# Patient Record
Sex: MALE | Race: WHITE | NOT HISPANIC OR LATINO | Employment: OTHER | URBAN - METROPOLITAN AREA
[De-identification: names, ages, dates, MRNs, and addresses within clinical notes are randomized per-mention and may not be internally consistent; named-entity substitution may affect disease eponyms.]

---

## 2024-03-30 ENCOUNTER — HOSPITAL ENCOUNTER (EMERGENCY)
Facility: HOSPITAL | Age: 69
Discharge: HOME | End: 2024-03-31
Attending: EMERGENCY MEDICINE
Payer: COMMERCIAL

## 2024-03-30 DIAGNOSIS — R10.31 RIGHT LOWER QUADRANT ABDOMINAL PAIN: Primary | ICD-10-CM

## 2024-03-30 LAB
ANION GAP BLDV CALCULATED.4IONS-SCNC: 13 MMOL/L (ref 10–25)
BASE EXCESS BLDV CALC-SCNC: -3.8 MMOL/L (ref -2–3)
BASOPHILS # BLD AUTO: 0.04 X10*3/UL (ref 0–0.1)
BASOPHILS NFR BLD AUTO: 0.9 %
BODY TEMPERATURE: 37 DEGREES CELSIUS
CA-I BLDV-SCNC: 1.18 MMOL/L (ref 1.1–1.33)
CHLORIDE BLDV-SCNC: 100 MMOL/L (ref 98–107)
EOSINOPHIL # BLD AUTO: 0.21 X10*3/UL (ref 0–0.7)
EOSINOPHIL NFR BLD AUTO: 4.7 %
ERYTHROCYTE [DISTWIDTH] IN BLOOD BY AUTOMATED COUNT: 16.1 % (ref 11.5–14.5)
GLUCOSE BLDV-MCNC: 85 MG/DL (ref 74–99)
HCO3 BLDV-SCNC: 21.5 MMOL/L (ref 22–26)
HCT VFR BLD AUTO: 28.7 % (ref 41–52)
HCT VFR BLD EST: 32 % (ref 41–52)
HGB BLD-MCNC: 10.1 G/DL (ref 13.5–17.5)
HGB BLDV-MCNC: 10.6 G/DL (ref 13.5–17.5)
IMM GRANULOCYTES # BLD AUTO: 0.06 X10*3/UL (ref 0–0.7)
IMM GRANULOCYTES NFR BLD AUTO: 1.3 % (ref 0–0.9)
INHALED O2 CONCENTRATION: 21 %
LACTATE BLDV-SCNC: 1.6 MMOL/L (ref 0.4–2)
LYMPHOCYTES # BLD AUTO: 1.35 X10*3/UL (ref 1.2–4.8)
LYMPHOCYTES NFR BLD AUTO: 30.1 %
MCH RBC QN AUTO: 28.5 PG (ref 26–34)
MCHC RBC AUTO-ENTMCNC: 35.2 G/DL (ref 32–36)
MCV RBC AUTO: 81 FL (ref 80–100)
MONOCYTES # BLD AUTO: 0.27 X10*3/UL (ref 0.1–1)
MONOCYTES NFR BLD AUTO: 6 %
NEUTROPHILS # BLD AUTO: 2.55 X10*3/UL (ref 1.2–7.7)
NEUTROPHILS NFR BLD AUTO: 57 %
NRBC BLD-RTO: 0 /100 WBCS (ref 0–0)
OXYHGB MFR BLDV: 71.4 % (ref 45–75)
PCO2 BLDV: 39 MM HG (ref 41–51)
PH BLDV: 7.35 PH (ref 7.33–7.43)
PLATELET # BLD AUTO: 290 X10*3/UL (ref 150–450)
PO2 BLDV: 47 MM HG (ref 35–45)
POTASSIUM BLDV-SCNC: 4.8 MMOL/L (ref 3.5–5.3)
RBC # BLD AUTO: 3.55 X10*6/UL (ref 4.5–5.9)
SAO2 % BLDV: 73 % (ref 45–75)
SODIUM BLDV-SCNC: 130 MMOL/L (ref 136–145)
WBC # BLD AUTO: 4.5 X10*3/UL (ref 4.4–11.3)

## 2024-03-30 PROCEDURE — 84132 ASSAY OF SERUM POTASSIUM: CPT

## 2024-03-30 PROCEDURE — 36415 COLL VENOUS BLD VENIPUNCTURE: CPT

## 2024-03-30 PROCEDURE — 99283 EMERGENCY DEPT VISIT LOW MDM: CPT

## 2024-03-30 PROCEDURE — 85025 COMPLETE CBC W/AUTO DIFF WBC: CPT

## 2024-03-30 PROCEDURE — 83690 ASSAY OF LIPASE: CPT

## 2024-03-30 PROCEDURE — 99284 EMERGENCY DEPT VISIT MOD MDM: CPT | Performed by: EMERGENCY MEDICINE

## 2024-03-30 PROCEDURE — 83735 ASSAY OF MAGNESIUM: CPT

## 2024-03-30 ASSESSMENT — PAIN DESCRIPTION - ONSET: ONSET: ONGOING

## 2024-03-30 ASSESSMENT — PAIN - FUNCTIONAL ASSESSMENT: PAIN_FUNCTIONAL_ASSESSMENT: 0-10

## 2024-03-30 ASSESSMENT — PAIN DESCRIPTION - PROGRESSION: CLINICAL_PROGRESSION: NOT CHANGED

## 2024-03-30 ASSESSMENT — COLUMBIA-SUICIDE SEVERITY RATING SCALE - C-SSRS
6. HAVE YOU EVER DONE ANYTHING, STARTED TO DO ANYTHING, OR PREPARED TO DO ANYTHING TO END YOUR LIFE?: YES
6. HAVE YOU EVER DONE ANYTHING, STARTED TO DO ANYTHING, OR PREPARED TO DO ANYTHING TO END YOUR LIFE?: YES
2. HAVE YOU ACTUALLY HAD ANY THOUGHTS OF KILLING YOURSELF?: YES
1. IN THE PAST MONTH, HAVE YOU WISHED YOU WERE DEAD OR WISHED YOU COULD GO TO SLEEP AND NOT WAKE UP?: YES
5. HAVE YOU STARTED TO WORK OUT OR WORKED OUT THE DETAILS OF HOW TO KILL YOURSELF? DO YOU INTEND TO CARRY OUT THIS PLAN?: YES
4. HAVE YOU HAD THESE THOUGHTS AND HAD SOME INTENTION OF ACTING ON THEM?: YES

## 2024-03-30 ASSESSMENT — PAIN DESCRIPTION - DESCRIPTORS: DESCRIPTORS: ACHING;CRAMPING

## 2024-03-30 ASSESSMENT — LIFESTYLE VARIABLES
EVER HAD A DRINK FIRST THING IN THE MORNING TO STEADY YOUR NERVES TO GET RID OF A HANGOVER: NO
HAVE PEOPLE ANNOYED YOU BY CRITICIZING YOUR DRINKING: NO
HAVE YOU EVER FELT YOU SHOULD CUT DOWN ON YOUR DRINKING: NO
TOTAL SCORE: 0
EVER FELT BAD OR GUILTY ABOUT YOUR DRINKING: NO

## 2024-03-30 ASSESSMENT — PAIN DESCRIPTION - PAIN TYPE: TYPE: ACUTE PAIN

## 2024-03-30 ASSESSMENT — PAIN DESCRIPTION - LOCATION: LOCATION: ABDOMEN

## 2024-03-30 ASSESSMENT — PAIN DESCRIPTION - FREQUENCY: FREQUENCY: CONSTANT/CONTINUOUS

## 2024-03-30 ASSESSMENT — PAIN SCALES - GENERAL
PAINLEVEL_OUTOF10: 10 - WORST POSSIBLE PAIN
PAINLEVEL_OUTOF10: 10 - WORST POSSIBLE PAIN

## 2024-03-31 VITALS
BODY MASS INDEX: 34.81 KG/M2 | WEIGHT: 235.01 LBS | HEIGHT: 69 IN | TEMPERATURE: 97 F | OXYGEN SATURATION: 97 % | HEART RATE: 80 BPM | SYSTOLIC BLOOD PRESSURE: 149 MMHG | DIASTOLIC BLOOD PRESSURE: 64 MMHG | RESPIRATION RATE: 18 BRPM

## 2024-03-31 PROBLEM — N17.9 AKI (ACUTE KIDNEY INJURY) (CMS-HCC): Status: ACTIVE | Noted: 2023-08-22

## 2024-03-31 PROBLEM — Z59.01 SHELTERED HOMELESSNESS: Status: ACTIVE | Noted: 2023-09-01

## 2024-03-31 PROBLEM — E53.8 B12 DEFICIENCY: Status: ACTIVE | Noted: 2019-09-19

## 2024-03-31 PROBLEM — I89.0 LYMPHEDEMA: Status: ACTIVE | Noted: 2024-03-25

## 2024-03-31 PROBLEM — I70.209 ATHEROSCLEROSIS OF NATIVE ARTERY OF EXTREMITY (CMS-HCC): Status: ACTIVE | Noted: 2023-09-25

## 2024-03-31 PROBLEM — E83.42 HYPOMAGNESEMIA: Status: ACTIVE | Noted: 2021-05-26

## 2024-03-31 PROBLEM — E66.812 OBESITY, CLASS II, BMI 35-39.9: Status: ACTIVE | Noted: 2023-09-26

## 2024-03-31 PROBLEM — G89.29 CHRONIC LOW BACK PAIN WITH BILATERAL SCIATICA: Status: ACTIVE | Noted: 2021-05-26

## 2024-03-31 PROBLEM — T14.8XXA WOUND INFECTION: Status: ACTIVE | Noted: 2023-08-11

## 2024-03-31 PROBLEM — R19.7 DIARRHEA: Status: ACTIVE | Noted: 2023-04-03

## 2024-03-31 PROBLEM — I48.0 PAROXYSMAL ATRIAL FIBRILLATION (MULTI): Status: ACTIVE | Noted: 2022-09-29

## 2024-03-31 PROBLEM — M48.061 SPINAL STENOSIS OF LUMBAR REGION: Status: ACTIVE | Noted: 2019-02-14

## 2024-03-31 PROBLEM — D64.9 ANEMIA: Status: ACTIVE | Noted: 2023-09-01

## 2024-03-31 PROBLEM — B35.6 FUNGAL INFECTION OF THE GROIN: Status: ACTIVE | Noted: 2023-08-08

## 2024-03-31 PROBLEM — E11.9 TYPE 2 DIABETES MELLITUS, WITHOUT LONG-TERM CURRENT USE OF INSULIN (MULTI): Chronic | Status: ACTIVE | Noted: 2021-05-26

## 2024-03-31 PROBLEM — M54.41 CHRONIC LOW BACK PAIN WITH BILATERAL SCIATICA: Status: ACTIVE | Noted: 2021-05-26

## 2024-03-31 PROBLEM — S99.921A FOOT TRAUMA, RIGHT, INITIAL ENCOUNTER: Status: ACTIVE | Noted: 2023-09-06

## 2024-03-31 PROBLEM — I87.8 VENOUS STASIS: Status: ACTIVE | Noted: 2024-03-25

## 2024-03-31 PROBLEM — K59.09 OTHER CONSTIPATION: Status: ACTIVE | Noted: 2024-03-31

## 2024-03-31 PROBLEM — S09.90XA HEAD INJURY: Status: ACTIVE | Noted: 2023-10-04

## 2024-03-31 PROBLEM — M54.42 CHRONIC LOW BACK PAIN WITH BILATERAL SCIATICA: Status: ACTIVE | Noted: 2021-05-26

## 2024-03-31 PROBLEM — G83.4 CAUDA EQUINA SYNDROME (MULTI): Status: ACTIVE | Noted: 2022-11-28

## 2024-03-31 PROBLEM — E66.9 OBESITY, CLASS I, BMI 30-34.9: Status: ACTIVE | Noted: 2023-08-09

## 2024-03-31 PROBLEM — T14.8XXA CHRONIC WOUND: Status: ACTIVE | Noted: 2023-08-19

## 2024-03-31 PROBLEM — K92.2 LOWER GI BLEED: Status: ACTIVE | Noted: 2023-01-15

## 2024-03-31 PROBLEM — R29.898 WEAKNESS OF BOTH LOWER EXTREMITIES: Status: ACTIVE | Noted: 2019-02-14

## 2024-03-31 PROBLEM — K56.41 FECAL IMPACTION (MULTI): Status: ACTIVE | Noted: 2024-03-31

## 2024-03-31 PROBLEM — Z22.322 MRSA (METHICILLIN RESISTANT STAPH AUREUS) CULTURE POSITIVE: Status: ACTIVE | Noted: 2019-08-28

## 2024-03-31 PROBLEM — E87.1 HYPO-OSMOLALITY AND HYPONATREMIA: Status: ACTIVE | Noted: 2022-08-26

## 2024-03-31 PROBLEM — M86.9 OSTEOMYELITIS (MULTI): Status: ACTIVE | Noted: 2022-05-30

## 2024-03-31 PROBLEM — L85.3 DRY SKIN DERMATITIS: Status: ACTIVE | Noted: 2020-01-31

## 2024-03-31 PROBLEM — F17.200 TOBACCO DEPENDENCE: Status: ACTIVE | Noted: 2021-05-07

## 2024-03-31 PROBLEM — M86.9 PYOGENIC INFLAMMATION OF BONE (MULTI): Status: ACTIVE | Noted: 2023-07-14

## 2024-03-31 PROBLEM — E87.20 METABOLIC ACIDOSIS: Status: ACTIVE | Noted: 2023-10-27

## 2024-03-31 PROBLEM — M86.9 OSTEOMYELITIS OF LEFT FOOT (MULTI): Status: ACTIVE | Noted: 2023-10-18

## 2024-03-31 PROBLEM — R53.81 PHYSICAL DECONDITIONING: Status: ACTIVE | Noted: 2019-01-26

## 2024-03-31 PROBLEM — N49.2 CELLULITIS OF SCROTUM: Status: ACTIVE | Noted: 2023-03-02

## 2024-03-31 PROBLEM — L97.512: Status: ACTIVE | Noted: 2023-06-02

## 2024-03-31 PROBLEM — E66.811 CLASS 1 OBESITY DUE TO EXCESS CALORIES WITH SERIOUS COMORBIDITY IN ADULT: Status: ACTIVE | Noted: 2023-08-23

## 2024-03-31 PROBLEM — Z89.432: Status: ACTIVE | Noted: 2020-07-28

## 2024-03-31 PROBLEM — M79.2 NEUROPATHIC PAIN: Status: ACTIVE | Noted: 2019-02-14

## 2024-03-31 PROBLEM — F10.10 ALCOHOL ABUSE: Status: ACTIVE | Noted: 2021-05-26

## 2024-03-31 PROBLEM — E66.9 OBESITY, UNSPECIFIED: Status: ACTIVE | Noted: 2022-03-27

## 2024-03-31 PROBLEM — F10.20 ALCOHOL USE DISORDER, MODERATE, DEPENDENCE (MULTI): Status: ACTIVE | Noted: 2021-03-26

## 2024-03-31 PROBLEM — M19.079 ANKLE ARTHRITIS: Status: ACTIVE | Noted: 2020-01-31

## 2024-03-31 PROBLEM — G62.9 POLYNEUROPATHY: Status: ACTIVE | Noted: 2022-09-29

## 2024-03-31 PROBLEM — M17.10 ARTHRITIS OF KNEE: Status: ACTIVE | Noted: 2020-01-31

## 2024-03-31 PROBLEM — M79.604 LOWER EXTREMITY PAIN, RIGHT: Status: ACTIVE | Noted: 2023-08-26

## 2024-03-31 PROBLEM — M19.90 ARTHRITIS: Status: ACTIVE | Noted: 2021-05-26

## 2024-03-31 PROBLEM — Z59.00 HOMELESS: Status: ACTIVE | Noted: 2023-08-19

## 2024-03-31 PROBLEM — I87.2 VENOUS STASIS DERMATITIS OF BOTH LOWER EXTREMITIES: Status: ACTIVE | Noted: 2022-09-29

## 2024-03-31 PROBLEM — T87.9: Status: ACTIVE | Noted: 2023-08-23

## 2024-03-31 PROBLEM — E87.1 HYPONATREMIA: Status: ACTIVE | Noted: 2023-06-19

## 2024-03-31 PROBLEM — Z89.439 HISTORY OF AMPUTATION OF FOOT (MULTI): Chronic | Status: ACTIVE | Noted: 2022-09-29

## 2024-03-31 PROBLEM — L08.9 WOUND INFECTION: Status: ACTIVE | Noted: 2023-08-11

## 2024-03-31 PROBLEM — L08.9 DIABETIC FOOT INFECTION (MULTI): Status: ACTIVE | Noted: 2022-05-30

## 2024-03-31 PROBLEM — I10 PRIMARY HYPERTENSION: Chronic | Status: ACTIVE | Noted: 2019-02-14

## 2024-03-31 PROBLEM — E66.09 CLASS 1 OBESITY DUE TO EXCESS CALORIES WITH SERIOUS COMORBIDITY IN ADULT: Status: ACTIVE | Noted: 2023-08-23

## 2024-03-31 PROBLEM — D72.819 LEUKOPENIA: Status: ACTIVE | Noted: 2023-10-27

## 2024-03-31 PROBLEM — L30.9 ECZEMA: Chronic | Status: ACTIVE | Noted: 2022-03-21

## 2024-03-31 PROBLEM — E11.628 DIABETIC FOOT INFECTION (MULTI): Status: ACTIVE | Noted: 2022-05-30

## 2024-03-31 PROBLEM — I16.0 HYPERTENSIVE URGENCY: Status: ACTIVE | Noted: 2024-03-31

## 2024-03-31 PROBLEM — R73.03 PRE-DIABETES: Status: ACTIVE | Noted: 2022-03-21

## 2024-03-31 PROBLEM — A41.9 SEPSIS (MULTI): Status: ACTIVE | Noted: 2024-03-25

## 2024-03-31 PROBLEM — M86.171 ACUTE OSTEOMYELITIS OF RIGHT FOOT (MULTI): Status: ACTIVE | Noted: 2022-09-13

## 2024-03-31 PROBLEM — R45.4 DIFFICULTY CONTROLLING ANGER: Status: ACTIVE | Noted: 2022-09-29

## 2024-03-31 PROBLEM — I50.9 CHRONIC CONGESTIVE HEART FAILURE (MULTI): Status: ACTIVE | Noted: 2022-09-29

## 2024-03-31 PROBLEM — I99.8 ASYMMETRIC BLOOD PRESSURES: Status: ACTIVE | Noted: 2019-10-16

## 2024-03-31 PROBLEM — D64.9 CHRONIC ANEMIA: Status: ACTIVE | Noted: 2019-01-26

## 2024-03-31 PROBLEM — E66.9 OBESITY, CLASS II, BMI 35-39.9: Status: ACTIVE | Noted: 2023-09-26

## 2024-03-31 PROBLEM — K80.20 CALCULUS OF GALLBLADDER WITHOUT CHOLECYSTITIS WITHOUT OBSTRUCTION: Status: ACTIVE | Noted: 2019-09-16

## 2024-03-31 PROBLEM — G89.18 POST-OPERATIVE PAIN: Status: ACTIVE | Noted: 2023-08-19

## 2024-03-31 PROBLEM — E66.811 OBESITY, CLASS I, BMI 30-34.9: Status: ACTIVE | Noted: 2023-08-09

## 2024-03-31 PROBLEM — R53.1 GENERALIZED WEAKNESS: Status: ACTIVE | Noted: 2019-02-24

## 2024-03-31 LAB
ALBUMIN SERPL BCP-MCNC: 3.9 G/DL (ref 3.4–5)
ALP SERPL-CCNC: 48 U/L (ref 33–136)
ALT SERPL W P-5'-P-CCNC: 12 U/L (ref 10–52)
ANION GAP SERPL CALC-SCNC: 13 MMOL/L (ref 10–20)
AST SERPL W P-5'-P-CCNC: 39 U/L (ref 9–39)
BILIRUB SERPL-MCNC: 0.5 MG/DL (ref 0–1.2)
BUN SERPL-MCNC: 21 MG/DL (ref 6–23)
CALCIUM SERPL-MCNC: 9.1 MG/DL (ref 8.6–10.6)
CHLORIDE SERPL-SCNC: 102 MMOL/L (ref 98–107)
CO2 SERPL-SCNC: 21 MMOL/L (ref 21–32)
CREAT SERPL-MCNC: 0.98 MG/DL (ref 0.5–1.3)
EGFRCR SERPLBLD CKD-EPI 2021: 84 ML/MIN/1.73M*2
GLUCOSE SERPL-MCNC: 80 MG/DL (ref 74–99)
LIPASE SERPL-CCNC: 11 U/L (ref 9–82)
MAGNESIUM SERPL-MCNC: 1.73 MG/DL (ref 1.6–2.4)
POTASSIUM SERPL-SCNC: 4.8 MMOL/L (ref 3.5–5.3)
PROT SERPL-MCNC: 7.2 G/DL (ref 6.4–8.2)
SODIUM SERPL-SCNC: 131 MMOL/L (ref 136–145)

## 2024-03-31 PROCEDURE — 2500000002 HC RX 250 W HCPCS SELF ADMINISTERED DRUGS (ALT 637 FOR MEDICARE OP, ALT 636 FOR OP/ED)

## 2024-03-31 PROCEDURE — 2500000001 HC RX 250 WO HCPCS SELF ADMINISTERED DRUGS (ALT 637 FOR MEDICARE OP)

## 2024-03-31 RX ORDER — PANTOPRAZOLE SODIUM 40 MG/1
40 TABLET, DELAYED RELEASE ORAL ONCE
Status: COMPLETED | OUTPATIENT
Start: 2024-03-31 | End: 2024-03-31

## 2024-03-31 RX ORDER — PANTOPRAZOLE SODIUM 40 MG/10ML
40 INJECTION, POWDER, LYOPHILIZED, FOR SOLUTION INTRAVENOUS ONCE
Status: DISCONTINUED | OUTPATIENT
Start: 2024-03-31 | End: 2024-03-31

## 2024-03-31 RX ORDER — SUCRALFATE 1 G/10ML
1 SUSPENSION ORAL EVERY 6 HOURS SCHEDULED
Status: DISCONTINUED | OUTPATIENT
Start: 2024-03-31 | End: 2024-03-31 | Stop reason: HOSPADM

## 2024-03-31 RX ORDER — PANTOPRAZOLE SODIUM 20 MG/1
40 TABLET, DELAYED RELEASE ORAL DAILY
Qty: 20 TABLET | Refills: 0 | Status: SHIPPED | OUTPATIENT
Start: 2024-03-31 | End: 2024-04-20

## 2024-03-31 RX ADMIN — PANTOPRAZOLE SODIUM 40 MG: 40 TABLET, DELAYED RELEASE ORAL at 00:55

## 2024-03-31 RX ADMIN — SUCRALFATE 1 G: 1 SUSPENSION ORAL at 00:55

## 2024-03-31 RX ADMIN — SUCRALFATE 1 G: 1 SUSPENSION ORAL at 06:00

## 2024-03-31 ASSESSMENT — PAIN SCALES - GENERAL
PAINLEVEL_OUTOF10: 0 - NO PAIN

## 2024-03-31 NOTE — PROGRESS NOTES
"Emergency Medicine Transition of Care Note.    I received Jem Joe in signout from Dr. Ramesh.  Please see the previous ED provider note for all HPI, PE and MDM up to the time of signout at 0700. This is in addition to the primary record.    In brief Jem Joe is an 68 y.o. male with multiple medical comorbidities including hypertension, diabetes, PVD, osteomyelitis, s/p bilateral forefoot amputations, chronic alcohol abuse, homelessness, multiple recent presentations to the emergency department for nausea, vomiting, abdominal pain presenting today with the same complaint of nausea vomiting and diarrhea for the past 3 weeks as well as a 2-day history of \"bloody vomit\" which he describes as dark brown.  Patient's workup significant for hemoglobin of 10.1 and mild hyponatremia 131.  Patient was ordered and administered Protonix and Carafate.    Chief Complaint   Patient presents with    Vomiting Blood     At the time of signout we were awaiting: Observation and reevaluation    Diagnoses as of 03/31/24 0632   Right lower quadrant abdominal pain       Medical Decision Making  Patient was not noted to have any other additional episodes of emesis while in the ED.  Upon reevaluation, patient is tolerating p.o.  Patient ordered outpatient primary care follow-up.  Patient prescribed Protonix.  Discussed ED findings, plan for outpatient primary care follow-up, and strict return precautions for any new or worsening symptoms.  Patient stated understanding and agreement with the plan.  All questions were answered.  Patient discharged in stable condition.    Final diagnoses:   [R10.31] Right lower quadrant abdominal pain           Procedure  Procedures    Patient presentation and plan discussed with attending physician.    Frantz Donaldson MD   "

## 2024-03-31 NOTE — ED PROVIDER NOTES
"HPI   Chief Complaint   Patient presents with    Vomiting Blood       HPI  Patient is a 68-year-old male with multiple medical comorbidities including hypertension, diabetes, PVD, osteomyelitis, s/p bilateral forefoot amputations, chronic alcohol abuse, homelessness, multiple recent presentations to the emergency department for nausea, vomiting, abdominal pain presenting today with the same complaint of nausea vomiting and diarrhea for the past 3 weeks as well as a 2-day history of \"bloody vomit\" which he describes as dark brown.  Patient reports 4-5 episodes of this bloody emesis in the past 2 days.  He also reports headache, chills, malaise for the past few weeks.  He states he is having trouble regulating his body temperature and frequently is cold.  Patient also has abdominal pain localized to the right lower quadrant as well as multiple daily episodes of diarrhea for the past 3 weeks patient also has had a dry cough for many months reports urinary incontinence for the past year.    On chart review patient is noted to present to the emergency department multiple times in recent months with similar complaints, including presenting this morning to a [facility] ED.  He has undergone extensive workup for his abdominal pain including multiple CT scans and abdominal ultrasounds which repeatedly showed no acute intra-abdominal process however patient is noted to have cholelithiasis.  Recent CT abdomen pelvis was this morning at [facility] and was unremarkable.    Of note, patient was admitted to the ICU on 3/24 for hypotension and hypothermia with temperature noted to be down to 31.7, also lactate elevated to 2.6.  Sepsis was ultimately ruled out and patient was stabilized and discharged.                  [place] Coma Scale Score: 15                     Patient History   No past medical history on file.  No past surgical history on file.  No family history on file.  Social History     Tobacco Use    Smoking " status: Not on file    Smokeless tobacco: Not on file   Substance Use Topics    Alcohol use: Not on file    Drug use: Not on file       Physical Exam   ED Triage Vitals   Temperature Heart Rate Respirations BP   03/30/24 2128 03/30/24 2128 03/30/24 2128 03/30/24 2128   36.4 °C (97.6 °F) 72 14 128/67      Pulse Ox Temp Source Heart Rate Source Patient Position   03/30/24 2236 03/30/24 2128 03/30/24 2128 03/30/24 2128   99 % Temporal Monitor Sitting      BP Location FiO2 (%)     03/30/24 2128 --     Right arm        Physical Exam  General: Patient appears disheveled  HEENT: no lesions, no scleral icterus, moist mucous membranes  Neuro: A&Ox3, grossly normal  CV: RRR, nrl S1, S2, no murmur, rubs, or clicks  Resp: Good bilateral air entry. No crackles,  wheezing, or rhonchi  Abdomen: Non distended, soft, mild tenderness to palpation in the right lower quadrant, no rebound rigidity or guarding.  Extremities: No lower extremity edema, + PP.  Bilateral forefoot amputation noted.  Skin: No jaundice, no lesions  Psych: Appropriate mood and behavior    ED Course & MDM        Medical Decision Making  68-year-old male with multiple comorbidities including hypertension, diabetes, peripheral vascular disease with bilateral forefoot amputation, chronic alcohol abuse, homelessness presenting with nausea, vomiting, abdominal pain the past 3 weeks as well as 2 days of bloody emesis that he describes as dark brown in color.  She also reports headache, chills, malaise, and diarrhea for the past 3 weeks.  Of note patient has presented to the ED numerous times in recent months including most recently this morning to [facility] where he had negative workup including CT abdomen pelvis and viral screen.  On arrival patient is hemodynamically and vitally stable.  Heart regular rate and rhythm, lungs clear, abdominal exam notable for mild tenderness to palpation of the right lower quadrant however no peritoneal signs.  VBG was obtained  and is notable only for hyponatremia with sodium of 130.  CBC shows hemoglobin of 10.1 which is stable at patient's baseline.    Will hold off on abdominal imaging at this time as patient just had a CT abdomen pelvis this morning which was unremarkable.  Will hold off on viral screen as well as patient was swabbed this morning.  Will treat patient symptomatically with Protonix and Carafate and monitor for any episodes of hematemesis while in the ED.    Patient was signed out to incoming resident at 0200.  At time of signout patient was sleeping comfortably and labs were only notable for mild hyponatremia to 131.  Patient currently being monitored for any episodes of hematemesis and will likely be discharged shortly as long as he remains asymptomatic.    Procedure  Procedures     Yuan Vázquez DO  Resident  03/31/24 0205

## 2024-03-31 NOTE — ED TRIAGE NOTES
Patient stated he has multiple episodes of vomiting blood with intermittent lower abd pain. Paitient denies bloody urine or stool. Endorses nausea, vomiting, and diarrhea for  weeks, with bilateral leg pain.

## 2024-03-31 NOTE — DISCHARGE INSTRUCTIONS
Please follow up with a primary care provider within 1 week of discharge. A referral has been sent for you to schedule an appointment via  appointment services.

## 2024-04-11 ENCOUNTER — NURSING HOME VISIT (OUTPATIENT)
Dept: POST ACUTE CARE | Facility: EXTERNAL LOCATION | Age: 69
End: 2024-04-11
Payer: COMMERCIAL

## 2024-04-11 DIAGNOSIS — F10.20 ALCOHOL USE DISORDER, MODERATE, DEPENDENCE (MULTI): ICD-10-CM

## 2024-04-11 DIAGNOSIS — E66.09 CLASS 1 OBESITY DUE TO EXCESS CALORIES WITH SERIOUS COMORBIDITY IN ADULT, UNSPECIFIED BMI: ICD-10-CM

## 2024-04-11 DIAGNOSIS — K56.41 FECAL IMPACTION (MULTI): ICD-10-CM

## 2024-04-11 DIAGNOSIS — I10 PRIMARY HYPERTENSION: Primary | Chronic | ICD-10-CM

## 2024-04-11 DIAGNOSIS — E87.1 HYPO-OSMOLALITY AND HYPONATREMIA: ICD-10-CM

## 2024-04-11 PROCEDURE — 99306 1ST NF CARE HIGH MDM 50: CPT | Performed by: STUDENT IN AN ORGANIZED HEALTH CARE EDUCATION/TRAINING PROGRAM

## 2024-04-11 ASSESSMENT — ENCOUNTER SYMPTOMS
CARDIOVASCULAR NEGATIVE: 1
PSYCHIATRIC NEGATIVE: 1
NAUSEA: 1
CONSTIPATION: 1
RESPIRATORY NEGATIVE: 1
DIZZINESS: 1
ABDOMINAL PAIN: 1
BACK PAIN: 1
CONSTITUTIONAL NEGATIVE: 1
ARTHRALGIAS: 1
ABDOMINAL DISTENTION: 1

## 2024-04-11 NOTE — LETTER
Patient: Jem Joe  : 1955    Encounter Date: 2024    Subjective  Patient ID: Jem Joe is a 68 y.o. male.    Patient is a 68-year-old male with past medical history of bilateral transmetatarsal amputation, venous insufficiency, diabetes, homeless, hypertension, obesity, alcohol use disorder, osteomyelitis who presents to the emergency room with diarrhea and abdominal pain.  Patient was seen multiple times for this and had not been eating or having any bowel movements.  Was having worsening diarrhea that is too much to count.  Started noticing blood in his stool as well.  Patient was seen in the emergency room, did have hyponatremia of 127 and was admitted for further management.  Diarrhea improved with supportive care, did not have any episodes of alcohol withdrawal.  Patient was recommended SNF for discharge.  Was subsequently discharged in stable condition.  On evaluation, patient overall feels well.  States that he is having some issues with pain, however overall stable.  Still having some issues with bowel movements, feels constipated at times.  Does not feel anxious.  Otherwise, states no additional issues or concerns.    Review of Systems   Constitutional: Negative.    HENT: Negative.     Respiratory: Negative.     Cardiovascular: Negative.    Gastrointestinal:  Positive for abdominal distention, abdominal pain, constipation and nausea.   Musculoskeletal:  Positive for arthralgias and back pain.   Skin: Negative.    Neurological:  Positive for dizziness.   Psychiatric/Behavioral: Negative.         ObjectiveVitals Reviewed via facility EMR     Physical Exam  Constitutional:       General: He is not in acute distress.     Appearance: He is not ill-appearing.      Comments: Unkempt, foul smelling, lack of hyegeine   Eyes:      Pupils: Pupils are equal, round, and reactive to light.   Cardiovascular:      Rate and Rhythm: Normal rate and regular rhythm.      Pulses: Normal pulses.      Heart  sounds: No murmur heard.  Pulmonary:      Effort: No respiratory distress.      Breath sounds: No wheezing.   Abdominal:      General: Abdomen is flat. There is distension.      Comments: Dec bowel sounds   Musculoskeletal:      Right lower leg: No edema.      Left lower leg: No edema.   Skin:     General: Skin is warm and dry.   Neurological:      Mental Status: He is alert. Mental status is at baseline.      Cranial Nerves: No cranial nerve deficit.      Motor: Weakness present.   Psychiatric:         Mood and Affect: Mood normal.         Behavior: Behavior normal.         Assessment/Plan  Diagnoses and all orders for this visit:  Primary hypertension  Class 1 obesity due to excess calories with serious comorbidity in adult, unspecified BMI    Patient seen and evaluated.  Hospital course reviewed.  Will need to closely monitor electrolytes, suspect underlying hyponatremia may be chronic and likely secondary to alcohol abuse.  Is having issues with constipation today.  Recommend KUB.  Try to keep magnesium and potassium greater than 4 and 2 respectively.  Obtain weekly labs.  PT OT.  Endorses slight issues with pain.  Will closely monitor, concerned about drug-seeking behaviors at this time however will monitor vitals and further evaluate.  Due to multiple ED visits, may benefit from long-term care.  Continue supportive care.  Routine labs.    Reviewed and approved by LITO MALDONADO on 4/11/24 at 8:33 PM.       Electronically Signed By: Lito Maldonado DO   4/11/24  8:33 PM

## 2024-04-12 NOTE — PROGRESS NOTES
Subjective   Patient ID: Jem Joe is a 68 y.o. male.    Patient is a 68-year-old male with past medical history of bilateral transmetatarsal amputation, venous insufficiency, diabetes, homeless, hypertension, obesity, alcohol use disorder, osteomyelitis who presents to the emergency room with diarrhea and abdominal pain.  Patient was seen multiple times for this and had not been eating or having any bowel movements.  Was having worsening diarrhea that is too much to count.  Started noticing blood in his stool as well.  Patient was seen in the emergency room, did have hyponatremia of 127 and was admitted for further management.  Diarrhea improved with supportive care, did not have any episodes of alcohol withdrawal.  Patient was recommended SNF for discharge.  Was subsequently discharged in stable condition.  On evaluation, patient overall feels well.  States that he is having some issues with pain, however overall stable.  Still having some issues with bowel movements, feels constipated at times.  Does not feel anxious.  Otherwise, states no additional issues or concerns.    Review of Systems   Constitutional: Negative.    HENT: Negative.     Respiratory: Negative.     Cardiovascular: Negative.    Gastrointestinal:  Positive for abdominal distention, abdominal pain, constipation and nausea.   Musculoskeletal:  Positive for arthralgias and back pain.   Skin: Negative.    Neurological:  Positive for dizziness.   Psychiatric/Behavioral: Negative.         Objective Vitals Reviewed via facility EMR     Physical Exam  Constitutional:       General: He is not in acute distress.     Appearance: He is not ill-appearing.      Comments: Unkempt, foul smelling, lack of hyegeine   Eyes:      Pupils: Pupils are equal, round, and reactive to light.   Cardiovascular:      Rate and Rhythm: Normal rate and regular rhythm.      Pulses: Normal pulses.      Heart sounds: No murmur heard.  Pulmonary:      Effort: No respiratory  distress.      Breath sounds: No wheezing.   Abdominal:      General: Abdomen is flat. There is distension.      Comments: Dec bowel sounds   Musculoskeletal:      Right lower leg: No edema.      Left lower leg: No edema.   Skin:     General: Skin is warm and dry.   Neurological:      Mental Status: He is alert. Mental status is at baseline.      Cranial Nerves: No cranial nerve deficit.      Motor: Weakness present.   Psychiatric:         Mood and Affect: Mood normal.         Behavior: Behavior normal.         Assessment/Plan   Diagnoses and all orders for this visit:  Primary hypertension  Class 1 obesity due to excess calories with serious comorbidity in adult, unspecified BMI    Patient seen and evaluated.  Hospital course reviewed.  Will need to closely monitor electrolytes, suspect underlying hyponatremia may be chronic and likely secondary to alcohol abuse.  Is having issues with constipation today.  Recommend KUB.  Try to keep magnesium and potassium greater than 4 and 2 respectively.  Obtain weekly labs.  PT OT.  Endorses slight issues with pain.  Will closely monitor, concerned about drug-seeking behaviors at this time however will monitor vitals and further evaluate.  Due to multiple ED visits, may benefit from long-term care.  Continue supportive care.  Routine labs.    Reviewed and approved by STACI MALDONADO on 4/11/24 at 8:33 PM.

## 2024-09-10 ENCOUNTER — NURSING HOME VISIT (OUTPATIENT)
Dept: POST ACUTE CARE | Facility: EXTERNAL LOCATION | Age: 69
End: 2024-09-10
Payer: MEDICARE

## 2024-09-10 DIAGNOSIS — K56.41 FECAL IMPACTION (MULTI): ICD-10-CM

## 2024-09-10 DIAGNOSIS — F10.20 ALCOHOL USE DISORDER, MODERATE, DEPENDENCE (MULTI): ICD-10-CM

## 2024-09-10 DIAGNOSIS — I48.0 PAROXYSMAL ATRIAL FIBRILLATION (MULTI): Primary | ICD-10-CM

## 2024-09-10 DIAGNOSIS — I10 PRIMARY HYPERTENSION: Chronic | ICD-10-CM

## 2024-09-10 DIAGNOSIS — E11.9 TYPE 2 DIABETES MELLITUS WITHOUT COMPLICATION, WITHOUT LONG-TERM CURRENT USE OF INSULIN (MULTI): Chronic | ICD-10-CM

## 2024-09-10 PROCEDURE — 99306 1ST NF CARE HIGH MDM 50: CPT | Performed by: STUDENT IN AN ORGANIZED HEALTH CARE EDUCATION/TRAINING PROGRAM

## 2024-09-10 NOTE — LETTER
Patient: Jem Joe  : 1955    Encounter Date: 09/10/2024    Subjective  Patient ID: Jem Joe is a 68 y.o. male.    Patient is a 68-year-old male with past medical history of hypertension, chronic right foot wound, osteomyelitis, alcohol use disorder, hyponatremia, and chronic diarrhea who is also homeless who presented to the emergency room with slight hyponatremia and hypomagnesia.  Patient had a x-ray imaging suggesting a soft tissue ulcer, and no definitive evidence of osteomyelitis.  Patient was admitted for further management of underlying foot wounds, and and improved with supportive care.  Patient was discharged in stable condition.  On evaluation, he regards that he is overall doing well without any issues or concerns.  Noted that he is having some constipation as well and has not had a bowel movement in a couple days.  Otherwise, states that he is tolerating all his medications.  Denies any additional issues or concerns.        Review of Systems   Constitutional:  Positive for fatigue.   HENT: Negative.     Respiratory: Negative.     Cardiovascular: Negative.    Gastrointestinal:  Positive for abdominal pain, constipation and diarrhea.   Musculoskeletal: Negative.    Skin: Negative.    Neurological:  Positive for weakness.   Psychiatric/Behavioral: Negative.         ObjectiveVitals Reviewed via facility EMR   Physical Exam  Constitutional:       General: He is not in acute distress.     Appearance: He is not ill-appearing.   Eyes:      Pupils: Pupils are equal, round, and reactive to light.   Cardiovascular:      Rate and Rhythm: Normal rate and regular rhythm.      Pulses: Normal pulses.      Heart sounds: No murmur heard.  Pulmonary:      Effort: No respiratory distress.      Breath sounds: No wheezing.   Abdominal:      General: Abdomen is flat. Bowel sounds are normal. There is no distension.   Musculoskeletal:      Right lower leg: No edema.      Left lower leg: No edema.   Skin:      General: Skin is warm and dry.   Neurological:      Mental Status: He is alert. Mental status is at baseline.      Cranial Nerves: No cranial nerve deficit.      Motor: Weakness present.   Psychiatric:         Mood and Affect: Mood normal.         Behavior: Behavior normal.         Assessment/Plan  Diagnoses and all orders for this visit:  Paroxysmal atrial fibrillation (Multi)  Primary hypertension  Type 2 diabetes mellitus without complication, without long-term current use of insulin (Multi)  Fecal impaction (Multi)  Alcohol use disorder, moderate, dependence (Multi)      Patient seen and examined at bedside.  Overall medically stable, continue optimization with physical therapy.  Due to chronic wounds continue multivitamins consider additional vitamin workup pending physical exam findings.  Recent labs reviewed and show no additional issues.  Will start bowel regimen due to issues with constipation.  Otherwise continue supportive care optimize PT OT.  Patient has been also admitted to hospital for his very similar issues in a short time period.  May ultimately benefit from long-term care.  Otherwise, no additional issues at this time.    Reviewed and approved by LITO MALDONADO on 9/12/24 at 10:44 PM.       Electronically Signed By: Lito Maldonado DO   9/12/24 10:44 PM

## 2024-09-12 ASSESSMENT — ENCOUNTER SYMPTOMS
DIARRHEA: 1
CONSTIPATION: 1
CARDIOVASCULAR NEGATIVE: 1
MUSCULOSKELETAL NEGATIVE: 1
FATIGUE: 1
ABDOMINAL PAIN: 1
WEAKNESS: 1
RESPIRATORY NEGATIVE: 1
PSYCHIATRIC NEGATIVE: 1

## 2024-09-13 NOTE — PROGRESS NOTES
Subjective   Patient ID: Jem Joe is a 68 y.o. male.    Patient is a 68-year-old male with past medical history of hypertension, chronic right foot wound, osteomyelitis, alcohol use disorder, hyponatremia, and chronic diarrhea who is also homeless who presented to the emergency room with slight hyponatremia and hypomagnesia.  Patient had a x-ray imaging suggesting a soft tissue ulcer, and no definitive evidence of osteomyelitis.  Patient was admitted for further management of underlying foot wounds, and and improved with supportive care.  Patient was discharged in stable condition.  On evaluation, he regards that he is overall doing well without any issues or concerns.  Noted that he is having some constipation as well and has not had a bowel movement in a couple days.  Otherwise, states that he is tolerating all his medications.  Denies any additional issues or concerns.        Review of Systems   Constitutional:  Positive for fatigue.   HENT: Negative.     Respiratory: Negative.     Cardiovascular: Negative.    Gastrointestinal:  Positive for abdominal pain, constipation and diarrhea.   Musculoskeletal: Negative.    Skin: Negative.    Neurological:  Positive for weakness.   Psychiatric/Behavioral: Negative.         Objective Vitals Reviewed via facility EMR   Physical Exam  Constitutional:       General: He is not in acute distress.     Appearance: He is not ill-appearing.   Eyes:      Pupils: Pupils are equal, round, and reactive to light.   Cardiovascular:      Rate and Rhythm: Normal rate and regular rhythm.      Pulses: Normal pulses.      Heart sounds: No murmur heard.  Pulmonary:      Effort: No respiratory distress.      Breath sounds: No wheezing.   Abdominal:      General: Abdomen is flat. Bowel sounds are normal. There is no distension.   Musculoskeletal:      Right lower leg: No edema.      Left lower leg: No edema.   Skin:     General: Skin is warm and dry.   Neurological:      Mental Status: He  is alert. Mental status is at baseline.      Cranial Nerves: No cranial nerve deficit.      Motor: Weakness present.   Psychiatric:         Mood and Affect: Mood normal.         Behavior: Behavior normal.         Assessment/Plan   Diagnoses and all orders for this visit:  Paroxysmal atrial fibrillation (Multi)  Primary hypertension  Type 2 diabetes mellitus without complication, without long-term current use of insulin (Multi)  Fecal impaction (Multi)  Alcohol use disorder, moderate, dependence (Multi)      Patient seen and examined at bedside.  Overall medically stable, continue optimization with physical therapy.  Due to chronic wounds continue multivitamins consider additional vitamin workup pending physical exam findings.  Recent labs reviewed and show no additional issues.  Will start bowel regimen due to issues with constipation.  Otherwise continue supportive care optimize PT OT.  Patient has been also admitted to hospital for his very similar issues in a short time period.  May ultimately benefit from long-term care.  Otherwise, no additional issues at this time.    Reviewed and approved by STACI MALDONADO on 9/12/24 at 10:44 PM.

## 2024-09-24 ENCOUNTER — NURSING HOME VISIT (OUTPATIENT)
Dept: POST ACUTE CARE | Facility: EXTERNAL LOCATION | Age: 69
End: 2024-09-24
Payer: MEDICARE

## 2024-09-24 DIAGNOSIS — E87.1 HYPONATREMIA: Primary | ICD-10-CM

## 2024-09-24 DIAGNOSIS — I48.0 PAROXYSMAL ATRIAL FIBRILLATION (MULTI): ICD-10-CM

## 2024-09-24 DIAGNOSIS — I10 PRIMARY HYPERTENSION: Chronic | ICD-10-CM

## 2024-09-24 DIAGNOSIS — R53.81 PHYSICAL DECONDITIONING: ICD-10-CM

## 2024-09-24 DIAGNOSIS — F10.20 ALCOHOL USE DISORDER, MODERATE, DEPENDENCE (MULTI): ICD-10-CM

## 2024-09-24 DIAGNOSIS — R19.7 DIARRHEA, UNSPECIFIED TYPE: ICD-10-CM

## 2024-09-24 PROCEDURE — 99308 SBSQ NF CARE LOW MDM 20: CPT | Performed by: STUDENT IN AN ORGANIZED HEALTH CARE EDUCATION/TRAINING PROGRAM

## 2024-09-24 NOTE — LETTER
Patient: Jem Joe  : 1955    Encounter Date: 2024    Subjective  Patient ID: Jem Joe is a 69 y.o. male.    Patient seen and examined at bedside.  Overall doing well but did have an issue with diarrhea recently.  States that he had persistent issues with diarrhea, however this has resolved.  Did see a GI doctor in the past for this, however this was about 4 to 5 years ago.  Otherwise, states no acute issues or concerns.  Abdominal pain is overall stable.  Feels overall well otherwise.        Review of Systems   Constitutional:  Positive for fatigue.   HENT: Negative.     Respiratory: Negative.     Cardiovascular: Negative.    Gastrointestinal: Negative.    Musculoskeletal: Negative.    Skin: Negative.    Neurological:  Positive for weakness.   Psychiatric/Behavioral: Negative.         ObjectiveVitals Reviewed via facility EMR   Physical Exam  Constitutional:       General: He is not in acute distress.     Appearance: He is not ill-appearing.   Eyes:      Pupils: Pupils are equal, round, and reactive to light.   Cardiovascular:      Rate and Rhythm: Normal rate and regular rhythm.      Pulses: Normal pulses.      Heart sounds: No murmur heard.  Pulmonary:      Effort: No respiratory distress.      Breath sounds: No wheezing.   Abdominal:      General: Abdomen is flat. Bowel sounds are normal. There is no distension.   Musculoskeletal:      Right lower leg: No edema.      Left lower leg: No edema.   Skin:     General: Skin is warm and dry.   Neurological:      Mental Status: He is alert. Mental status is at baseline.      Cranial Nerves: No cranial nerve deficit.      Motor: No weakness.   Psychiatric:         Mood and Affect: Mood normal.         Behavior: Behavior normal.         Assessment/Plan  Diagnoses and all orders for this visit:  Hyponatremia  Paroxysmal atrial fibrillation (Multi)  Primary hypertension  Alcohol use disorder, moderate, dependence (Multi)  Physical  deconditioning  Diarrhea, unspecified type    Patient seen and examined at bedside.  Overall medically stable, did have some issues with diarrhea recently.  This is subsequently resolved I suspect that this is underlying IBS.  Did discuss GI referral however he defers at this time.  Otherwise continue supportive care, and fall precautions.  No additional issues or concerns at this time.    Reviewed and approved by LITO MALDONADO on 9/25/24 at 10:10 PM.       Electronically Signed By: Lito Maldonado DO   9/25/24 10:10 PM

## 2024-09-25 ASSESSMENT — ENCOUNTER SYMPTOMS
PSYCHIATRIC NEGATIVE: 1
CARDIOVASCULAR NEGATIVE: 1
FATIGUE: 1
WEAKNESS: 1
GASTROINTESTINAL NEGATIVE: 1
RESPIRATORY NEGATIVE: 1
MUSCULOSKELETAL NEGATIVE: 1

## 2024-09-26 NOTE — PROGRESS NOTES
Subjective   Patient ID: Jem Joe is a 69 y.o. male.    Patient seen and examined at bedside.  Overall doing well but did have an issue with diarrhea recently.  States that he had persistent issues with diarrhea, however this has resolved.  Did see a GI doctor in the past for this, however this was about 4 to 5 years ago.  Otherwise, states no acute issues or concerns.  Abdominal pain is overall stable.  Feels overall well otherwise.        Review of Systems   Constitutional:  Positive for fatigue.   HENT: Negative.     Respiratory: Negative.     Cardiovascular: Negative.    Gastrointestinal: Negative.    Musculoskeletal: Negative.    Skin: Negative.    Neurological:  Positive for weakness.   Psychiatric/Behavioral: Negative.         Objective Vitals Reviewed via facility EMR   Physical Exam  Constitutional:       General: He is not in acute distress.     Appearance: He is not ill-appearing.   Eyes:      Pupils: Pupils are equal, round, and reactive to light.   Cardiovascular:      Rate and Rhythm: Normal rate and regular rhythm.      Pulses: Normal pulses.      Heart sounds: No murmur heard.  Pulmonary:      Effort: No respiratory distress.      Breath sounds: No wheezing.   Abdominal:      General: Abdomen is flat. Bowel sounds are normal. There is no distension.   Musculoskeletal:      Right lower leg: No edema.      Left lower leg: No edema.   Skin:     General: Skin is warm and dry.   Neurological:      Mental Status: He is alert. Mental status is at baseline.      Cranial Nerves: No cranial nerve deficit.      Motor: No weakness.   Psychiatric:         Mood and Affect: Mood normal.         Behavior: Behavior normal.         Assessment/Plan   Diagnoses and all orders for this visit:  Hyponatremia  Paroxysmal atrial fibrillation (Multi)  Primary hypertension  Alcohol use disorder, moderate, dependence (Multi)  Physical deconditioning  Diarrhea, unspecified type    Patient seen and examined at bedside.   Overall medically stable, did have some issues with diarrhea recently.  This is subsequently resolved I suspect that this is underlying IBS.  Did discuss GI referral however he defers at this time.  Otherwise continue supportive care, and fall precautions.  No additional issues or concerns at this time.    Reviewed and approved by STACI MALDONADO on 9/25/24 at 10:10 PM.

## 2025-07-10 ENCOUNTER — LAB REQUISITION (OUTPATIENT)
Dept: LAB | Facility: HOSPITAL | Age: 70
End: 2025-07-10
Payer: MEDICARE

## 2025-07-10 DIAGNOSIS — A41.9 SEPSIS, UNSPECIFIED ORGANISM (MULTI): ICD-10-CM

## 2025-07-10 LAB
ERYTHROCYTE [DISTWIDTH] IN BLOOD BY AUTOMATED COUNT: 14.6 % (ref 11.5–14.5)
HCT VFR BLD AUTO: 28.4 % (ref 41–52)
HGB BLD-MCNC: 9.5 G/DL (ref 13.5–17.5)
MCH RBC QN AUTO: 29.4 PG (ref 26–34)
MCHC RBC AUTO-ENTMCNC: 33.5 G/DL (ref 32–36)
MCV RBC AUTO: 88 FL (ref 80–100)
NRBC BLD-RTO: 0 /100 WBCS (ref 0–0)
PLATELET # BLD AUTO: 277 X10*3/UL (ref 150–450)
RBC # BLD AUTO: 3.23 X10*6/UL (ref 4.5–5.9)
WBC # BLD AUTO: 6.2 X10*3/UL (ref 4.4–11.3)

## 2025-07-10 PROCEDURE — 85027 COMPLETE CBC AUTOMATED: CPT | Mod: OUT | Performed by: FAMILY MEDICINE

## 2025-07-10 PROCEDURE — 84075 ASSAY ALKALINE PHOSPHATASE: CPT | Mod: OUT | Performed by: FAMILY MEDICINE

## 2025-07-11 LAB
ALBUMIN SERPL BCP-MCNC: 3.5 G/DL (ref 3.4–5)
ALP SERPL-CCNC: 74 U/L (ref 33–136)
ALT SERPL W P-5'-P-CCNC: 18 U/L (ref 10–52)
ANION GAP SERPL CALC-SCNC: 14 MMOL/L (ref 10–20)
AST SERPL W P-5'-P-CCNC: 21 U/L (ref 9–39)
BILIRUB SERPL-MCNC: 0.3 MG/DL (ref 0–1.2)
BUN SERPL-MCNC: 25 MG/DL (ref 6–23)
CALCIUM SERPL-MCNC: 8.8 MG/DL (ref 8.6–10.3)
CHLORIDE SERPL-SCNC: 100 MMOL/L (ref 98–107)
CO2 SERPL-SCNC: 23 MMOL/L (ref 21–32)
CREAT SERPL-MCNC: 0.98 MG/DL (ref 0.5–1.3)
EGFRCR SERPLBLD CKD-EPI 2021: 83 ML/MIN/1.73M*2
GLUCOSE SERPL-MCNC: 118 MG/DL (ref 74–99)
POTASSIUM SERPL-SCNC: 4.9 MMOL/L (ref 3.5–5.3)
PROT SERPL-MCNC: 6 G/DL (ref 6.4–8.2)
SODIUM SERPL-SCNC: 132 MMOL/L (ref 136–145)

## 2025-08-05 ENCOUNTER — LAB REQUISITION (OUTPATIENT)
Dept: LAB | Facility: HOSPITAL | Age: 70
End: 2025-08-05
Payer: MEDICARE

## 2025-08-05 DIAGNOSIS — A49.9 BACTERIAL INFECTION, UNSPECIFIED: ICD-10-CM

## 2025-08-05 LAB
ALBUMIN SERPL BCP-MCNC: 3.8 G/DL (ref 3.4–5)
ALP SERPL-CCNC: 78 U/L (ref 33–136)
ALT SERPL W P-5'-P-CCNC: 19 U/L (ref 10–52)
ANION GAP SERPL CALC-SCNC: 12 MMOL/L (ref 10–20)
AST SERPL W P-5'-P-CCNC: 22 U/L (ref 9–39)
BASOPHILS # BLD AUTO: 0.05 X10*3/UL (ref 0–0.1)
BASOPHILS NFR BLD AUTO: 1.2 %
BILIRUB SERPL-MCNC: 0.8 MG/DL (ref 0–1.2)
BUN SERPL-MCNC: 11 MG/DL (ref 6–23)
CALCIUM SERPL-MCNC: 8.5 MG/DL (ref 8.6–10.3)
CHLORIDE SERPL-SCNC: 92 MMOL/L (ref 98–107)
CO2 SERPL-SCNC: 26 MMOL/L (ref 21–32)
CREAT SERPL-MCNC: 0.65 MG/DL (ref 0.5–1.3)
EGFRCR SERPLBLD CKD-EPI 2021: >90 ML/MIN/1.73M*2
EOSINOPHIL # BLD AUTO: 0.4 X10*3/UL (ref 0–0.7)
EOSINOPHIL NFR BLD AUTO: 9.5 %
ERYTHROCYTE [DISTWIDTH] IN BLOOD BY AUTOMATED COUNT: 13.5 % (ref 11.5–14.5)
GLUCOSE SERPL-MCNC: 78 MG/DL (ref 74–99)
HCT VFR BLD AUTO: 31.2 % (ref 41–52)
HGB BLD-MCNC: 10.5 G/DL (ref 13.5–17.5)
IMM GRANULOCYTES # BLD AUTO: 0.02 X10*3/UL (ref 0–0.7)
IMM GRANULOCYTES NFR BLD AUTO: 0.5 % (ref 0–0.9)
LYMPHOCYTES # BLD AUTO: 0.91 X10*3/UL (ref 1.2–4.8)
LYMPHOCYTES NFR BLD AUTO: 21.6 %
MCH RBC QN AUTO: 28.3 PG (ref 26–34)
MCHC RBC AUTO-ENTMCNC: 33.7 G/DL (ref 32–36)
MCV RBC AUTO: 84 FL (ref 80–100)
MONOCYTES # BLD AUTO: 0.3 X10*3/UL (ref 0.1–1)
MONOCYTES NFR BLD AUTO: 7.1 %
NEUTROPHILS # BLD AUTO: 2.54 X10*3/UL (ref 1.2–7.7)
NEUTROPHILS NFR BLD AUTO: 60.1 %
NRBC BLD-RTO: 0 /100 WBCS (ref 0–0)
PLATELET # BLD AUTO: 231 X10*3/UL (ref 150–450)
POTASSIUM SERPL-SCNC: 3.9 MMOL/L (ref 3.5–5.3)
PROT SERPL-MCNC: 6.5 G/DL (ref 6.4–8.2)
RBC # BLD AUTO: 3.71 X10*6/UL (ref 4.5–5.9)
SODIUM SERPL-SCNC: 126 MMOL/L (ref 136–145)
WBC # BLD AUTO: 4.2 X10*3/UL (ref 4.4–11.3)

## 2025-08-05 PROCEDURE — 85025 COMPLETE CBC W/AUTO DIFF WBC: CPT | Mod: OUT | Performed by: INTERNAL MEDICINE

## 2025-08-05 PROCEDURE — 84075 ASSAY ALKALINE PHOSPHATASE: CPT | Mod: OUT | Performed by: INTERNAL MEDICINE

## 2025-08-05 PROCEDURE — 36415 COLL VENOUS BLD VENIPUNCTURE: CPT | Mod: OUT | Performed by: INTERNAL MEDICINE

## 2025-08-06 ENCOUNTER — LAB REQUISITION (OUTPATIENT)
Dept: LAB | Facility: HOSPITAL | Age: 70
End: 2025-08-06
Payer: MEDICARE

## 2025-08-06 DIAGNOSIS — A41.9 SEPSIS, UNSPECIFIED ORGANISM (MULTI): ICD-10-CM

## 2025-08-06 LAB
ANION GAP SERPL CALC-SCNC: 11 MMOL/L (ref 10–20)
BASOPHILS # BLD AUTO: 0.04 X10*3/UL (ref 0–0.1)
BASOPHILS NFR BLD AUTO: 0.9 %
BUN SERPL-MCNC: 11 MG/DL (ref 6–23)
CALCIUM SERPL-MCNC: 8.7 MG/DL (ref 8.6–10.3)
CHLORIDE SERPL-SCNC: 91 MMOL/L (ref 98–107)
CO2 SERPL-SCNC: 26 MMOL/L (ref 21–32)
CREAT SERPL-MCNC: 0.6 MG/DL (ref 0.5–1.3)
EGFRCR SERPLBLD CKD-EPI 2021: >90 ML/MIN/1.73M*2
EOSINOPHIL # BLD AUTO: 0.39 X10*3/UL (ref 0–0.7)
EOSINOPHIL NFR BLD AUTO: 8.7 %
ERYTHROCYTE [DISTWIDTH] IN BLOOD BY AUTOMATED COUNT: 13.5 % (ref 11.5–14.5)
GLUCOSE SERPL-MCNC: 78 MG/DL (ref 74–99)
HCT VFR BLD AUTO: 31.1 % (ref 41–52)
HGB BLD-MCNC: 10.2 G/DL (ref 13.5–17.5)
IMM GRANULOCYTES # BLD AUTO: 0.02 X10*3/UL (ref 0–0.7)
IMM GRANULOCYTES NFR BLD AUTO: 0.4 % (ref 0–0.9)
LYMPHOCYTES # BLD AUTO: 1.23 X10*3/UL (ref 1.2–4.8)
LYMPHOCYTES NFR BLD AUTO: 27.4 %
MCH RBC QN AUTO: 27.6 PG (ref 26–34)
MCHC RBC AUTO-ENTMCNC: 32.8 G/DL (ref 32–36)
MCV RBC AUTO: 84 FL (ref 80–100)
MONOCYTES # BLD AUTO: 0.37 X10*3/UL (ref 0.1–1)
MONOCYTES NFR BLD AUTO: 8.2 %
NEUTROPHILS # BLD AUTO: 2.44 X10*3/UL (ref 1.2–7.7)
NEUTROPHILS NFR BLD AUTO: 54.4 %
NRBC BLD-RTO: 0 /100 WBCS (ref 0–0)
PLATELET # BLD AUTO: 253 X10*3/UL (ref 150–450)
POTASSIUM SERPL-SCNC: 3.7 MMOL/L (ref 3.5–5.3)
RBC # BLD AUTO: 3.69 X10*6/UL (ref 4.5–5.9)
SODIUM SERPL-SCNC: 124 MMOL/L (ref 136–145)
WBC # BLD AUTO: 4.5 X10*3/UL (ref 4.4–11.3)

## 2025-08-06 PROCEDURE — 82374 ASSAY BLOOD CARBON DIOXIDE: CPT | Mod: OUT | Performed by: INTERNAL MEDICINE

## 2025-08-06 PROCEDURE — 36415 COLL VENOUS BLD VENIPUNCTURE: CPT | Mod: OUT | Performed by: INTERNAL MEDICINE

## 2025-08-06 PROCEDURE — 85025 COMPLETE CBC W/AUTO DIFF WBC: CPT | Mod: OUT | Performed by: INTERNAL MEDICINE

## 2025-08-07 ENCOUNTER — LAB REQUISITION (OUTPATIENT)
Dept: LAB | Facility: HOSPITAL | Age: 70
End: 2025-08-07
Payer: MEDICARE

## 2025-08-07 DIAGNOSIS — E86.0 DEHYDRATION: ICD-10-CM

## 2025-08-07 LAB
ANION GAP SERPL CALC-SCNC: 9 MMOL/L (ref 10–20)
BUN SERPL-MCNC: 9 MG/DL (ref 6–23)
CALCIUM SERPL-MCNC: 8.8 MG/DL (ref 8.6–10.3)
CHLORIDE SERPL-SCNC: 93 MMOL/L (ref 98–107)
CO2 SERPL-SCNC: 27 MMOL/L (ref 21–32)
CREAT SERPL-MCNC: 0.55 MG/DL (ref 0.5–1.3)
EGFRCR SERPLBLD CKD-EPI 2021: >90 ML/MIN/1.73M*2
GLUCOSE SERPL-MCNC: 77 MG/DL (ref 74–99)
POTASSIUM SERPL-SCNC: 3.8 MMOL/L (ref 3.5–5.3)
SODIUM SERPL-SCNC: 125 MMOL/L (ref 136–145)

## 2025-08-07 PROCEDURE — 36415 COLL VENOUS BLD VENIPUNCTURE: CPT | Mod: OUT | Performed by: INTERNAL MEDICINE

## 2025-08-07 PROCEDURE — 80048 BASIC METABOLIC PNL TOTAL CA: CPT | Mod: OUT | Performed by: INTERNAL MEDICINE

## 2025-08-08 ENCOUNTER — LAB REQUISITION (OUTPATIENT)
Dept: LAB | Facility: HOSPITAL | Age: 70
End: 2025-08-08
Payer: MEDICARE

## 2025-08-08 DIAGNOSIS — E86.0 DEHYDRATION: ICD-10-CM

## 2025-08-08 LAB
ANION GAP SERPL CALC-SCNC: 10 MMOL/L (ref 10–20)
BUN SERPL-MCNC: 8 MG/DL (ref 6–23)
CALCIUM SERPL-MCNC: 9.1 MG/DL (ref 8.6–10.3)
CHLORIDE SERPL-SCNC: 92 MMOL/L (ref 98–107)
CO2 SERPL-SCNC: 27 MMOL/L (ref 21–32)
CREAT SERPL-MCNC: 0.69 MG/DL (ref 0.5–1.3)
EGFRCR SERPLBLD CKD-EPI 2021: >90 ML/MIN/1.73M*2
GLUCOSE SERPL-MCNC: 70 MG/DL (ref 74–99)
POTASSIUM SERPL-SCNC: 4.1 MMOL/L (ref 3.5–5.3)
SODIUM SERPL-SCNC: 125 MMOL/L (ref 136–145)

## 2025-08-08 PROCEDURE — 36415 COLL VENOUS BLD VENIPUNCTURE: CPT | Mod: OUT | Performed by: INTERNAL MEDICINE

## 2025-08-08 PROCEDURE — 80048 BASIC METABOLIC PNL TOTAL CA: CPT | Mod: OUT | Performed by: INTERNAL MEDICINE

## 2025-08-17 ENCOUNTER — LAB REQUISITION (OUTPATIENT)
Dept: LAB | Facility: HOSPITAL | Age: 70
End: 2025-08-17
Payer: MEDICARE

## 2025-08-17 DIAGNOSIS — E87.1 HYPO-OSMOLALITY AND HYPONATREMIA: ICD-10-CM
